# Patient Record
Sex: FEMALE | Race: WHITE | NOT HISPANIC OR LATINO | URBAN - METROPOLITAN AREA
[De-identification: names, ages, dates, MRNs, and addresses within clinical notes are randomized per-mention and may not be internally consistent; named-entity substitution may affect disease eponyms.]

---

## 2021-01-29 ENCOUNTER — IMMUNIZATIONS (OUTPATIENT)
Dept: FAMILY MEDICINE CLINIC | Facility: HOSPITAL | Age: 81
End: 2021-01-29

## 2021-01-29 DIAGNOSIS — Z23 ENCOUNTER FOR IMMUNIZATION: Primary | ICD-10-CM

## 2021-01-29 PROCEDURE — 0011A SARS-COV-2 / COVID-19 MRNA VACCINE (MODERNA) 100 MCG: CPT

## 2021-01-29 PROCEDURE — 91301 SARS-COV-2 / COVID-19 MRNA VACCINE (MODERNA) 100 MCG: CPT

## 2021-02-26 ENCOUNTER — IMMUNIZATIONS (OUTPATIENT)
Dept: FAMILY MEDICINE CLINIC | Facility: HOSPITAL | Age: 81
End: 2021-02-26

## 2021-02-26 DIAGNOSIS — Z23 ENCOUNTER FOR IMMUNIZATION: Primary | ICD-10-CM

## 2021-02-26 PROCEDURE — 0012A SARS-COV-2 / COVID-19 MRNA VACCINE (MODERNA) 100 MCG: CPT

## 2021-02-26 PROCEDURE — 91301 SARS-COV-2 / COVID-19 MRNA VACCINE (MODERNA) 100 MCG: CPT

## 2024-12-30 ENCOUNTER — OFFICE VISIT (OUTPATIENT)
Dept: URGENT CARE | Facility: CLINIC | Age: 84
End: 2024-12-30
Payer: COMMERCIAL

## 2024-12-30 VITALS
RESPIRATION RATE: 12 BRPM | TEMPERATURE: 96.6 F | OXYGEN SATURATION: 98 % | HEART RATE: 73 BPM | SYSTOLIC BLOOD PRESSURE: 156 MMHG | WEIGHT: 131 LBS | DIASTOLIC BLOOD PRESSURE: 76 MMHG

## 2024-12-30 DIAGNOSIS — S39.012A STRAIN OF MUSCLE, FASCIA AND TENDON OF LOWER BACK, INITIAL ENCOUNTER: Primary | ICD-10-CM

## 2024-12-30 PROCEDURE — 99203 OFFICE O/P NEW LOW 30 MIN: CPT | Performed by: PHYSICIAN ASSISTANT

## 2024-12-30 RX ORDER — PREDNISONE 10 MG/1
TABLET ORAL
Qty: 30 TABLET | Refills: 0 | Status: SHIPPED | OUTPATIENT
Start: 2024-12-30

## 2024-12-30 NOTE — PROGRESS NOTES
[facility]        NAME: Saba Bridges is a 84 y.o. female  : 1940    MRN: [id number]  DATE: 2024  TIME: 4:36 PM    Assessment and Plan   Strain of muscle, fascia and tendon of lower back, initial encounter [S39.012A]  1. Strain of muscle, fascia and tendon of lower back, initial encounter  predniSONE 10 mg tablet            Patient Instructions       Follow up with PCP in 3-5 days.  Proceed to  ER if symptoms worsen.    If tests are performed, our office will contact you with results only if changes need to made to the care plan discussed with you at the visit. [facility]    Chief Complaint     Chief Complaint   Patient presents with    Suture / Staple Removal     Pt presents with need for staple removal from head// injury one week ago         History of Present Illness       1 week ago patient sustained a fall where she fell down 8 still steps.  Patient sustained a laceration to the crown of her head, a skin tear to the dorsum of her left hand.  Patient had staples placed to her head, bandage to her left hand.  X-ray to her hand, CAT scan of the head, x-ray to lumbar area.  All imaging was normal per patient.  Patient states that since that visit she developed pain to the left hip area.  Patient states no real pain with walking weightbearing.  Patient states that she gets sharp twinges of pain when moving in bed or doing certain movements.  Patient describes the pain as posterior and points to her low back area.  No loss of bowel or bladder function normal gait.        Review of Systems   Review of Systems   Constitutional:  Negative for chills, diaphoresis, fatigue and fever.   HENT:  Negative for congestion, ear pain, rhinorrhea, sinus pressure and voice change.    Eyes: Negative.    Respiratory:  Negative for cough, chest tightness and shortness of breath.    Cardiovascular:  Negative for chest pain and palpitations.   Gastrointestinal:   Negative for abdominal pain, constipation, diarrhea, nausea and vomiting.   Endocrine: Negative.    Genitourinary:  Negative for dysuria.   Musculoskeletal:  Positive for back pain. Negative for gait problem, joint swelling, myalgias and neck pain.   Skin:  Negative for pallor and rash.   Allergic/Immunologic: Negative.    Neurological:  Negative for dizziness, syncope and headaches.   Hematological: Negative.    Psychiatric/Behavioral: Negative.           Current Medications       Current Outpatient Medications:     predniSONE 10 mg tablet, Take 4 pills PO once in the morning for first 3 days.  3 Pills PO for next 3 days, 2 pills PO for next 3 days, 1 pill PO for next 3 days., Disp: 30 tablet, Rfl: 0    Current Allergies     Allergies as of 12/30/2024    (No Known Allergies)            The following portions of the patient's history were reviewed and updated as appropriate: allergies, current medications, past family history, past medical history, past social history, past surgical history and problem list.     History reviewed. No pertinent past medical history.    History reviewed. No pertinent surgical history.    History reviewed. No pertinent family history.      Medications have been verified.        Objective   /76   Pulse 73   Temp (!) 96.6 °F (35.9 °C)   Resp 12   Wt 59.4 kg (131 lb)   LMP  (LMP Unknown)   SpO2 98%        Physical Exam     Physical Exam  Vitals and nursing note reviewed.   Constitutional:       General: She is not in acute distress.     Appearance: Normal appearance. She is well-developed. She is not ill-appearing or diaphoretic.   HENT:      Head: Normocephalic and atraumatic.      Comments: Well-healed laceration to the crown of her head with 3 staples in place  Eyes:      General:         Right eye: No discharge.         Left eye: No discharge.      Conjunctiva/sclera: Conjunctivae normal.   Cardiovascular:      Rate and Rhythm: Normal rate and regular rhythm.      Heart  sounds: Normal heart sounds.   Pulmonary:      Effort: Pulmonary effort is normal. No respiratory distress.      Breath sounds: Normal breath sounds. No wheezing, rhonchi or rales.   Musculoskeletal:      Cervical back: Normal range of motion and neck supple.      Comments: Full range of motion of lumbar spine in flexion extension twisting lateral bending.  Negative straight leg raise.  Negative sitting root.  No midline point tenderness.  Full range of motion of left hip and flexion extension internal and external rotation.  Normal gait.  No tenderness to palpation to any aspect of lumbar spine or hip.  No gross swelling or deformity of either area   Lymphadenopathy:      Cervical: No cervical adenopathy.   Skin:     General: Skin is warm.      Capillary Refill: Capillary refill takes less than 2 seconds.      Findings: No rash.      Comments: Small skin tear to the dorsum of left hand.  Appears well-healing.  No redness swelling or discharge   Neurological:      Mental Status: She is alert.

## 2024-12-30 NOTE — PATIENT INSTRUCTIONS
"Continue to monitor symptoms.  If new or worsening symptoms develop, go immediately to Er. Drink plenty of fluids.  Follow up with Family Doctor this week.    If tests are performed, our office will contact you with results only if changes need to made to the care plan discussed with you at the visit. You can review your full results on [facility].     Patient Education     Low back pain - Discharge instructions   The Basics   Written by the doctors and editors at [facility]   What are discharge instructions? -- Discharge instructions are information about how to take care of yourself after getting medical care for a health problem.  What is low back pain? -- Low back pain is pain or discomfort in the lower part of your back. Many people have low back pain at some point, and it most often gets better on its own. Many different things can cause low back pain. Most of the time, doctors do not know the exact cause.  Back pain can happen if you strain a muscle. This is often what has happened when a person \"throws out\" their back. This refers to pain that starts suddenly after physical activity, like lifting something heavy or bending the back.  Back pain can also happen if you have (figure 1):   Damaged, bulging, or torn discs   Arthritis affecting the joints of the spine   Bony growths on the vertebrae that crowd nearby nerves   A \"compression fracture\" due to osteoporosis (a condition that makes your bones weak)   A vertebra out of place   Narrowing in the spinal canal   A tumor or infection (but this is very rare)  How do I care for myself at home? -- Ask the doctor or nurse what you should do when you go home. Make sure that you understand exactly what you need to do to care for yourself. Ask questions if there is anything you do not understand.  You should also:   Use heat on your back for short periods of time, if it helps with pain. Put a heating pad (on the low setting) on your back for 20 minutes at a " "time a few times each day. Never go to sleep with heat on your back.   Try to stay as active as you can without causing too much pain, if your doctor or nurse said that it is OK. If your pain is severe, you might need to rest for a day or 2. But it's important to get back to walking and moving as soon as possible. Try to keep doing your normal daily activities. Get up and move around gently during the day as you are able.   Slowly start to increase your activity level as you are able to. If something causes your pain to come back or get worse, stop and go back to doing easier activities that did not hurt.   Avoid sitting or standing in 1 position for a long time. You might want to sleep with a pillow under or between your knees if this eases your pain.   Take a medicine like ibuprofen (sample brand names: Advil, Motrin) or naproxen (brand name: Aleve) for pain, if needed. These are nonsteroidal antiinflammatory drugs (\"NSAIDs\"). They might work better than acetaminophen for low back pain.   Talk to your doctor or nurse before trying any of the following. These treatments might help you feel better for a little while:   Spinal manipulation - This is when a chiropractor, physical therapist, or other professional moves or \"adjusts\" the joints of your back.   Acupuncture - This is when someone who knows traditional Chinese medicine inserts tiny needles through your skin to block pain signals.   Massage therapy - The massage therapist manipulates your muscles and soft tissues to decrease muscle tension and increase relaxation.  What follow-up care do I need? -- Your doctor or nurse will tell you if you need to make a follow-up appointment. If so, make sure that you know when and where to go. The doctor might suggest that you see a physical therapist to learn exercises to help with your back pain.  When should I call the doctor? -- Call for emergency help right away (in the [place] and [place], call 9-1-1) if:   You are unable " to walk, or cannot control your bowels or bladder.   You develop a fever of 100.4°F (38°C) or higher, chills, or night sweats.  Call your doctor for advice if:   Your legs are numb, weak, or tingly.   Your pain is getting worse, even with medicines and rest.   You develop a new rash.  All topics are updated as new evidence becomes available and our peer review process is complete.  This topic retrieved from [employer] on: May 15, 2024.  Topic 870079 Version 1.0  Release: 32.4.3 - C32.134  © 2024 UpToDate, Inc. and/or its affiliates. All rights reserved.  figure 1: Anatomy of the back     This drawing shows the different parts of the back. Back pain can be caused by problems with the muscles, ligaments, discs, bones (vertebrae), or nerves.  Graphic 83862 Version 6.0  Consumer Information Use and Disclaimer   Disclaimer: This generalized information is a limited summary of diagnosis, treatment, and/or medication information. It is not meant to be comprehensive and should be used as a tool to help the user understand and/or assess potential diagnostic and treatment options. It does NOT include all information about conditions, treatments, medications, side effects, or risks that may apply to a specific patient. It is not intended to be medical advice or a substitute for the medical advice, diagnosis, or treatment of a health care provider based on the health care provider's examination and assessment of a patient's specific and unique circumstances. Patients must speak with a health care provider for complete information about their health, medical questions, and treatment options, including any risks or benefits regarding use of medications. This information does not endorse any treatments or medications as safe, effective, or approved for treating a specific patient. UpToDate, Inc. and its affiliates disclaim any warranty or liability relating to this information or the use thereof.The use of this information is  governed by the Terms of Use, available at https://www.[employer]wer.com/en/know/clinical-effectiveness-terms. 2024© UpToDate, Inc. and its affiliates and/or licensors. All rights reserved.  Copyright   © 2024 UpToDate, Inc. and/or its affiliates. All rights reserved.  Patient Education     Removing staples   The Basics   Written by the doctors and editors at [facility]   What are staples? -- Staples are a way doctors can close certain types of cuts. Staples that go into the body are different from those used on paper. To put staples in, doctors use a special stapler (figure 1). Staples need to be taken out after a certain amount of time.  How are staples removed? -- Staples are removed by a doctor or nurse. Usually, staples are taken out 7 to 10 days after they were put in, depending on where they are.  Staples are taken out with a special staple remover. But doctors' offices don't always have this device. The doctor who puts in your staples might give you a staple remover. If so, bring it to your doctor's office when you have your staples taken out.  When it is time for your staples to be removed, the doctor or nurse will check your cut to make sure that it is healing well.  To remove each staple, the doctor or nurse will:   Insert the end of the staple remover under the staple   Use the tool to open the staple and pull it up out of the skin  To some people, having staples removed feels like pinching. But it should not be painful.  When all of the staples are out, the doctor or nurse will clean the cut. They might put a bandage, dressing, or small sticky strips over the area to protect it.  How do I care for myself at home? -- Ask the doctor or nurse what you should do when you go home. Make sure that you understand exactly what you need to do to care for yourself. Ask questions if there is anything you do not understand.  You should also:   If you have small sticky strips on your cut, leave them on until they fall  off, or until your doctor or nurse says to take them off.   Wash your hands before and after touching your cut.   Follow your doctor or nurse's instructions about:   When you should clean the wound and how to change your bandage or dressing, if needed   Physical activity   Take care of your scar:   Protect the area from the sun - Always use sunscreen or wear clothes or a hat that cover the scar.   Your doctor or nurse might also recommend using a special lotion or cream to help your scar heal.  When should I call the doctor? -- Call for advice if:   You have signs of infection - These include:   Swelling, redness, or warmth around the cut   Yellow, green, or bloody discharge from the cut   Bad smell coming from the cut   The cut becomes painful   Your cut opens up again.  All topics are updated as new evidence becomes available and our peer review process is complete.  This topic retrieved from [employer] on: Apr 11, 2024.  Topic 755684 Version 1.0  Release: 32.3.2 - C32.100  © 2024 UpToDate, Inc. and/or its affiliates. All rights reserved.  figure 1: Staples to close a cut     Sometimes, doctors use staples to close a cut. These staples are different from the staples used in paper. To put them in, doctors use a special stapler. Staples need to be taken out by a doctor or nurse after a certain amount of time.  Graphic 76026 Version 3.0  Consumer Information Use and Disclaimer   Disclaimer: This generalized information is a limited summary of diagnosis, treatment, and/or medication information. It is not meant to be comprehensive and should be used as a tool to help the user understand and/or assess potential diagnostic and treatment options. It does NOT include all information about conditions, treatments, medications, side effects, or risks that may apply to a specific patient. It is not intended to be medical advice or a substitute for the medical advice, diagnosis, or treatment of a health care provider based on  the health care provider's examination and assessment of a patient's specific and unique circumstances. Patients must speak with a health care provider for complete information about their health, medical questions, and treatment options, including any risks or benefits regarding use of medications. This information does not endorse any treatments or medications as safe, effective, or approved for treating a specific patient. UpToDate, Inc. and its affiliates disclaim any warranty or liability relating to this information or the use thereof.The use of this information is governed by the Terms of Use, available at https://www.[employer].com/en/know/clinical-effectiveness-terms. 2024© UpToDate, Inc. and its affiliates and/or licensors. All rights reserved.  Copyright   © 2024 UpToDate, Inc. and/or its affiliates. All rights reserved.